# Patient Record
Sex: MALE | Race: WHITE | NOT HISPANIC OR LATINO | URBAN - METROPOLITAN AREA
[De-identification: names, ages, dates, MRNs, and addresses within clinical notes are randomized per-mention and may not be internally consistent; named-entity substitution may affect disease eponyms.]

---

## 2019-12-06 ENCOUNTER — IMPORTED ENCOUNTER (OUTPATIENT)
Dept: URBAN - METROPOLITAN AREA CLINIC 31 | Facility: CLINIC | Age: 34
End: 2019-12-06

## 2019-12-06 PROBLEM — H18.59: Noted: 2019-12-06

## 2019-12-06 PROBLEM — H53.413: Noted: 2019-12-06

## 2019-12-06 PROCEDURE — 99244 OFF/OP CNSLTJ NEW/EST MOD 40: CPT

## 2019-12-06 PROCEDURE — 76514 ECHO EXAM OF EYE THICKNESS: CPT

## 2019-12-06 PROCEDURE — 92133 CPTRZD OPH DX IMG PST SGM ON: CPT

## 2019-12-06 PROCEDURE — 92083 EXTENDED VISUAL FIELD XM: CPT

## 2019-12-06 PROCEDURE — 92025 CPTRIZED CORNEAL TOPOGRAPHY: CPT

## 2019-12-06 NOTE — PATIENT DISCUSSION
Reviewed ABMD and risk of corneal erosions and if central visual distortion.   Recommend clinical observation artificial tear lubrication during the day and Neville 128ung qhs

## 2022-01-19 ENCOUNTER — IMPORTED ENCOUNTER (OUTPATIENT)
Dept: URBAN - METROPOLITAN AREA CLINIC 31 | Facility: CLINIC | Age: 37
End: 2022-01-19

## 2022-01-19 PROBLEM — H18.593: Noted: 2022-01-19

## 2022-01-19 PROBLEM — H18.59: Noted: 2022-01-19

## 2022-01-19 PROCEDURE — 92014 COMPRE OPH EXAM EST PT 1/>: CPT

## 2022-01-19 NOTE — PATIENT DISCUSSION
1.  ABMD OU - mild monitor. 2. Refractive error - update glasses for PRN distance use. 3.    Return in 1 year for comprehensive exam with Dr Suzanna Santiago

## 2022-04-02 ASSESSMENT — TONOMETRY
OD_IOP_MMHG: 16
OS_IOP_MMHG: 16
OD_IOP_MMHG: 15
OS_IOP_MMHG: 14

## 2022-04-02 ASSESSMENT — VISUAL ACUITY
OD_SC: 20/40-1
OS_SC: 20/30-1
OS_CC: 20/30-2
OD_SC: 20/40
OD_CC: 20/25-1
OD_PH: CC 20/30 +2
OS_SC: 20/40-2
OS_PH: CC 20/30 -1